# Patient Record
Sex: FEMALE | Race: WHITE
[De-identification: names, ages, dates, MRNs, and addresses within clinical notes are randomized per-mention and may not be internally consistent; named-entity substitution may affect disease eponyms.]

---

## 2018-11-26 ENCOUNTER — HOSPITAL ENCOUNTER (OUTPATIENT)
Dept: HOSPITAL 97 - 2ND | Age: 78
Setting detail: OBSERVATION
LOS: 2 days | Discharge: HOME HEALTH | End: 2018-11-28
Attending: INTERNAL MEDICINE | Admitting: INTERNAL MEDICINE
Payer: COMMERCIAL

## 2018-11-26 DIAGNOSIS — D64.9: Primary | ICD-10-CM

## 2018-11-26 DIAGNOSIS — K20.9: ICD-10-CM

## 2018-11-26 DIAGNOSIS — Z86.73: ICD-10-CM

## 2018-11-26 DIAGNOSIS — K44.9: ICD-10-CM

## 2018-11-26 DIAGNOSIS — I48.91: ICD-10-CM

## 2018-11-26 DIAGNOSIS — K92.2: ICD-10-CM

## 2018-11-26 DIAGNOSIS — I10: ICD-10-CM

## 2018-11-26 DIAGNOSIS — K29.70: ICD-10-CM

## 2018-11-26 LAB
ALBUMIN SERPL BCP-MCNC: 3.3 G/DL (ref 3.4–5)
ALP SERPL-CCNC: 63 U/L (ref 45–117)
ALT SERPL W P-5'-P-CCNC: 35 U/L (ref 12–78)
AST SERPL W P-5'-P-CCNC: 31 U/L (ref 15–37)
BUN BLD-MCNC: 34 MG/DL (ref 7–18)
FERRITIN SERPL-MCNC: 122.1 NG/ML (ref 8–388)
GLUCOSE SERPLBLD-MCNC: 96 MG/DL (ref 74–106)
HCT VFR BLD CALC: 25.6 % (ref 36–45)
INR BLD: 1.56
LYMPHOCYTES # SPEC AUTO: 1.1 K/UL (ref 0.7–4.9)
MAGNESIUM SERPL-MCNC: 2.5 MG/DL (ref 1.8–2.4)
MCH RBC QN AUTO: 33.7 PG (ref 27–35)
MCV RBC: 100 FL (ref 80–100)
PMV BLD: 7.6 FL (ref 7.6–11.3)
POTASSIUM SERPL-SCNC: 3.6 MMOL/L (ref 3.5–5.1)
RBC # BLD: 2.56 M/UL (ref 3.86–4.86)
UA COMPLETE W REFLEX CULTURE PNL UR: (no result)
UA DIPSTICK W REFLEX MICRO PNL UR: (no result)

## 2018-11-26 PROCEDURE — 36415 COLL VENOUS BLD VENIPUNCTURE: CPT

## 2018-11-26 PROCEDURE — 83735 ASSAY OF MAGNESIUM: CPT

## 2018-11-26 PROCEDURE — 85730 THROMBOPLASTIN TIME PARTIAL: CPT

## 2018-11-26 PROCEDURE — 85014 HEMATOCRIT: CPT

## 2018-11-26 PROCEDURE — 43239 EGD BIOPSY SINGLE/MULTIPLE: CPT

## 2018-11-26 PROCEDURE — 71046 X-RAY EXAM CHEST 2 VIEWS: CPT

## 2018-11-26 PROCEDURE — 94760 N-INVAS EAR/PLS OXIMETRY 1: CPT

## 2018-11-26 PROCEDURE — 81015 MICROSCOPIC EXAM OF URINE: CPT

## 2018-11-26 PROCEDURE — 82728 ASSAY OF FERRITIN: CPT

## 2018-11-26 PROCEDURE — 88312 SPECIAL STAINS GROUP 1: CPT

## 2018-11-26 PROCEDURE — 80048 BASIC METABOLIC PNL TOTAL CA: CPT

## 2018-11-26 PROCEDURE — 86850 RBC ANTIBODY SCREEN: CPT

## 2018-11-26 PROCEDURE — 85025 COMPLETE CBC W/AUTO DIFF WBC: CPT

## 2018-11-26 PROCEDURE — 84100 ASSAY OF PHOSPHORUS: CPT

## 2018-11-26 PROCEDURE — 86901 BLOOD TYPING SEROLOGIC RH(D): CPT

## 2018-11-26 PROCEDURE — 88305 TISSUE EXAM BY PATHOLOGIST: CPT

## 2018-11-26 PROCEDURE — 93005 ELECTROCARDIOGRAM TRACING: CPT

## 2018-11-26 PROCEDURE — 81003 URINALYSIS AUTO W/O SCOPE: CPT

## 2018-11-26 PROCEDURE — 74177 CT ABD & PELVIS W/CONTRAST: CPT

## 2018-11-26 PROCEDURE — 73502 X-RAY EXAM HIP UNI 2-3 VIEWS: CPT

## 2018-11-26 PROCEDURE — 88313 SPECIAL STAINS GROUP 2: CPT

## 2018-11-26 PROCEDURE — 85610 PROTHROMBIN TIME: CPT

## 2018-11-26 PROCEDURE — 36430 TRANSFUSION BLD/BLD COMPNT: CPT

## 2018-11-26 PROCEDURE — 73721 MRI JNT OF LWR EXTRE W/O DYE: CPT

## 2018-11-26 PROCEDURE — 87040 BLOOD CULTURE FOR BACTERIA: CPT

## 2018-11-26 PROCEDURE — 80076 HEPATIC FUNCTION PANEL: CPT

## 2018-11-26 PROCEDURE — 87086 URINE CULTURE/COLONY COUNT: CPT

## 2018-11-26 PROCEDURE — 87088 URINE BACTERIA CULTURE: CPT

## 2018-11-26 PROCEDURE — 70551 MRI BRAIN STEM W/O DYE: CPT

## 2018-11-26 PROCEDURE — 85018 HEMOGLOBIN: CPT

## 2018-11-26 PROCEDURE — 86900 BLOOD TYPING SEROLOGIC ABO: CPT

## 2018-11-26 RX ADMIN — MELATONIN SCH: 3 TAB ORAL at 21:00

## 2018-11-26 RX ADMIN — IRON SUPPLEMENT SCH: 325 TABLET ORAL at 21:00

## 2018-11-26 RX ADMIN — SODIUM CHLORIDE SCH MLS: 0.45 INJECTION, SOLUTION INTRAVENOUS at 15:12

## 2018-11-26 RX ADMIN — ATORVASTATIN CALCIUM SCH: 10 TABLET, FILM COATED ORAL at 21:00

## 2018-11-26 NOTE — RAD REPORT
EXAM DESCRIPTION:  RAD - Hip Left 2 View - 11/26/2018 5:23 pm

 

CLINICAL HISTORY:  Fall, hip pain

 

COMPARISON:  None.

 

FINDINGS:  AP and frogleg views of the left hip were obtained. There is no fracture or dislocation. N
o acute or destructive bony process seen.

 

No soft tissue abnormality.

 

 

IMPRESSION:  Negative left hip examination for acute or significant findings.

## 2018-11-26 NOTE — RAD REPORT
EXAM DESCRIPTION:  CT - Abdomen   Pelvis W Contrast - 11/26/2018 5:00 pm

 

CLINICAL HISTORY:  GI bleed, abdominal pain, diarrhea

 

COMPARISON:  None.

 

TECHNIQUE:  Biphasic, helical CT imaging of the abdomen and pelvis was performed following 100 ml non
-ionic IV contrast. Oral contrast was given.

 

All CT scans are performed using dose optimization technique as appropriate and may include automated
 exposure control or mA/KV adjustment according to patient size.

 

FINDINGS:  No suspicious findings in the lung bases. Cardiomegaly present without pericardial effusio
n.

 

The liver, spleen, and pancreas show no suspicious findings. Two large gallstones are present in a no
rmal-sized gallbladder. No acute gallbladder process seen. No biliary tree dilatation.

 

Symmetric renal function is seen with no hydronephrosis or suspicious renal mass. No pyelonephritis o
r acute renal parenchymal process. No urinary bladder abnormality. Uterus is absent. Ovaries are abse
nt or atrophic.

 

No gastric dilatation or wall thickening. Stomach is only partially filled. No dilated large or small
 bowel. Patient has moderate stool volume throughout the colon and a moderately prominent sigmoid div
erticulosis. No diverticulitis findings.  No free air, free fluid or inflammatory stranding.  No norris
ia, mass or bulky lymphadenopathy. The urinary bladder is without significant finding. No adrenal abn
ormality.

 

No suspicious bony findings.

 

 

IMPRESSION:  No free air, obstruction or other surgically emergent finding.

 

Moderately large stool volume throughout the colon with prominent diverticulosis. No diverticulitis o
r acute GI process seen.

 

Cholelithiasis without active gallbladder or biliary tree process identifiable.

 

Cardiomegaly.

## 2018-11-26 NOTE — RAD REPORT
EXAM DESCRIPTION:  MRI - Brain Wo Cont - 11/26/2018 5:49 pm

 

CLINICAL HISTORY:  Syncope, right-sided weakness

 

COMPARISON:  None.

 

TECHNIQUE:  Sagittal T1-weighted images were obtained along with axial PD, heavily T2-weighted and T2
-FLAIR images. Axial DWI and ADC mapping sequences were also obtained along with coronal heavily T2-w
eighted images.

 

FINDINGS:  No intracranial hemorrhage, mass or acute infarction. There is no edema or shift of midlin
e structures. Mild to moderate underlying atrophy changes are present. Ventricular size is in proport
ion. There is a large area of abnormal T2 signal with associated volume loss changes in the left cere
bral frontal parietal junction. Scattered white matter signal abnormalities elsewhere in the cerebral
 hemispheres. Brainstem, cerebellum and basal ganglia are mostly spared. Gray-matter/white matter rosey
ction is preserved. Signal voids are seen as a normal finding in the major intracranial vessels.

 

No globe or orbital content abnormality. Sella and suprasellar regions are normal.

 

Mastoid air cells and paranasal sinuses are clear.

 

 

IMPRESSION:  No acute infarction. No hemorrhage, mass or acute intracranial finding.

 

Scattered atrophy and chronic ischemic change with focally more pronounced signal abnormality related
 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

## 2018-11-26 NOTE — RAD REPORT
EXAM DESCRIPTION:  RAD - Chest Pa And Lat (2 Views) - 11/26/2018 5:22 pm

 

CLINICAL HISTORY:  Syncope, chest pain

 

COMPARISON:  None.

 

TECHNIQUE:  PA and lateral views of the chest were obtained.

 

FINDINGS:  The lungs are fibrotic.  No peripheral mass or consolidation. Upper lobe vasculature withi
n normal limits. Mild cardiomegaly without vascular engorgement. Trachea is midline. No pleural effus
ion or pneumothorax seen.  No acute bony finding noted.  No aortic abnormality.

 

IMPRESSION:  Prominent fibrotic lung pattern without acute cardiopulmonary finding.

## 2018-11-26 NOTE — RAD REPORT
EXAM DESCRIPTION:  MRI - Hip Left Wo Cont - 11/26/2018 6:19 pm

 

CLINICAL HISTORY:  Multiple fall history, pain out of proportion to imaging and physical exam finding
s

 

COMPARISON:  Left hip November 26, CT imaging November 26

 

TECHNIQUE:  Multiplanar imaging of the pelvis and hip joints performed using T1 weighted, T2 fat satu
ration, T2 STIR and proton density sequencing.

 

FINDINGS:  Marrow signal characteristics of the proximal left femur are normal and similar to the rig
ht side. No sacral ala fracture. Bony pelvis is intact. No joint effusion or periarticular abnormalit
y seen. Contusion or edema changes are present in the inferior muscle fibers of the left-side gluteus
 musculature posterior to the left ischium. This is likely the impact site of the recent fall. There 
is no hematoma. No signal abnormality in the ischium.

 

IMPRESSION:  No hip fracture.

 

Bruising or contusion changes in the muscles and soft tissues posterior to the left ischium. There is
 no measurable hematoma.

## 2018-11-27 LAB
ANISOCYTOSIS BLD QL: (no result)
BLD SMEAR INTERP: (no result)
BUN BLD-MCNC: 24 MG/DL (ref 7–18)
GLUCOSE SERPLBLD-MCNC: 85 MG/DL (ref 74–106)
HCT VFR BLD CALC: 21.2 % (ref 36–45)
LYMPHOCYTES # SPEC AUTO: 0.7 K/UL (ref 0.7–4.9)
MAGNESIUM SERPL-MCNC: 2.4 MG/DL (ref 1.8–2.4)
MCH RBC QN AUTO: 34.2 PG (ref 27–35)
MCV RBC: 99 FL (ref 80–100)
MORPHOLOGY BLD-IMP: (no result)
PMV BLD: 8.3 FL (ref 7.6–11.3)
POTASSIUM SERPL-SCNC: 3.9 MMOL/L (ref 3.5–5.1)
RBC # BLD: 2.15 M/UL (ref 3.86–4.86)

## 2018-11-27 PROCEDURE — 0DB68ZX EXCISION OF STOMACH, VIA NATURAL OR ARTIFICIAL OPENING ENDOSCOPIC, DIAGNOSTIC: ICD-10-PCS

## 2018-11-27 PROCEDURE — 0DB58ZX EXCISION OF ESOPHAGUS, VIA NATURAL OR ARTIFICIAL OPENING ENDOSCOPIC, DIAGNOSTIC: ICD-10-PCS

## 2018-11-27 PROCEDURE — 30233N1 TRANSFUSION OF NONAUTOLOGOUS RED BLOOD CELLS INTO PERIPHERAL VEIN, PERCUTANEOUS APPROACH: ICD-10-PCS

## 2018-11-27 RX ADMIN — IRON SUPPLEMENT SCH: 325 TABLET ORAL at 20:48

## 2018-11-27 RX ADMIN — ATORVASTATIN CALCIUM SCH: 10 TABLET, FILM COATED ORAL at 20:48

## 2018-11-27 RX ADMIN — MELATONIN SCH: 3 TAB ORAL at 20:48

## 2018-11-27 RX ADMIN — DIGOXIN SCH: 125 TABLET ORAL at 08:43

## 2018-11-27 RX ADMIN — MAGNESIUM OXIDE TAB 400 MG (241.3 MG ELEMENTAL MG) SCH: 400 (241.3 MG) TAB at 08:44

## 2018-11-27 NOTE — EKG
Test Date:    2018-11-26               Test Time:    16:33:13

Technician:   AKHIL                                     

                                                     

MEASUREMENT RESULTS:                                       

Intervals:                                           

Rate:         69                                     

WI:                                                  

QRSD:         86                                     

QT:           410                                    

QTc:          439                                    

Axis:                                                

P:                                                   

WI:                                                  

QRS:          58                                     

T:            -17                                    

                                                     

INTERPRETIVE STATEMENTS:                                       

                                                     

Atrial fibrillation

Low voltage QRS

Nonspecific ST and T wave abnormality, probably digitalis effect

Abnormal ECG

No previous ECG available for comparison



Electronically Signed On 11-27-18 12:09:15 CST by Isaiah Gates

## 2018-11-27 NOTE — P.PN
Subjective


Date of Service: 11/27/18


Chief Complaint: FEELS BETTER,  SOME WEAK, GENERAL.





SHE HAS NO CHEST PAIN, DYSPNEA, WEAKNESS OR FATIGUE.  





Review of Systems


10-point ROS is otherwise unremarkable





Physical Examination





- Vital Signs


Temperature: 97.8 F


Blood Pressure: 110/60


Pulse: 71


Respirations: 16


Pulse Ox (%): 98





- Physical Exam


General: Alert, In no apparent distress


HEENT: Atraumatic, PERRLA, EOMI


Neck: Supple, JVD not distended


Respiratory: Clear to auscultation bilaterally, Normal air movement


Cardiovascular: Regular rate/rhythm, Normal S1 S2


Gastrointestinal: Normal bowel sounds, No tenderness


Musculoskeletal: No tenderness


Integumentary: No rashes


Neurological: Normal speech, Normal tone, Normal affect


Lymphatics: No axilla or inguinal lymphadenopathy





- Studies


Laboratory Data (last 24 hrs)





11/27/18 06:00: Sodium 142, Potassium 3.9, BUN 24 H, Creatinine 0.80, Glucose 85

, Magnesium 2.4


11/27/18 06:00: WBC 2.8 L D, Hgb 7.3 L*, Hct 21.2 L D, Plt Count 168





Medications List Reviewed: Yes





Assessment And Plan





- Current Problems (Diagnosis)


(1) Anemia


Current Visit: Yes   Status: Acute   


Plan: 


DOWN TO 7.3 GM.


TRANSFUSE TWO UNITS PACKED RBCS.


CALLED DR. CHRISTOPHER TO GET EGD DONE. 


HE IS NOT ON CALL TODAY. 


THIS WHOLE WEEK WE HAVE NO GI DOCTOR ON CALL AT THIS HOSPITAL 


I STARTED HER ON PROTONIX.





TALKED TO SON AND ANSWERS ALL QUESTIONS, ABOUT MRI, CT SCAN, GALL STONES.  SHE 

WITH NO PAIN IN ABDOMEN , GALL STONES ARE LEFT ALONE. SHE HAS NO PULMONARY 

SYMPTOMS.








(2) Gastrointestinal bleed


Onset Date: 11/27/18   Current Visit: Yes   Status: Acute

## 2018-11-28 LAB
BUN BLD-MCNC: 18 MG/DL (ref 7–18)
GLUCOSE SERPLBLD-MCNC: 97 MG/DL (ref 74–106)
HCT VFR BLD CALC: 32.6 % (ref 36–45)
HCT VFR BLD CALC: 33.1 % (ref 36–45)
LYMPHOCYTES # SPEC AUTO: 1 K/UL (ref 0.7–4.9)
MAGNESIUM SERPL-MCNC: 2.4 MG/DL (ref 1.8–2.4)
MCH RBC QN AUTO: 32.7 PG (ref 27–35)
MCV RBC: 95.4 FL (ref 80–100)
PMV BLD: 8 FL (ref 7.6–11.3)
POTASSIUM SERPL-SCNC: 4.5 MMOL/L (ref 3.5–5.1)
RBC # BLD: 3.42 M/UL (ref 3.86–4.86)

## 2018-11-28 RX ADMIN — MAGNESIUM OXIDE TAB 400 MG (241.3 MG ELEMENTAL MG) SCH: 400 (241.3 MG) TAB at 08:56

## 2018-11-28 RX ADMIN — DIGOXIN SCH: 125 TABLET ORAL at 08:56

## 2018-11-28 RX ADMIN — SODIUM CHLORIDE SCH: 0.45 INJECTION, SOLUTION INTRAVENOUS at 00:20

## 2018-11-28 NOTE — OP
Date of Procedure:  11/27/2018



Surgeon:  Isidro Huitron MD



Procedure To Be Performed:  Esophagogastroduodenoscopy.



Indication For Procedure:  Anemia, suspected upper GI bleed.



Plan For Anesthesia:  Monitored anesthesia care.



Technique:  After obtaining informed consent from the patient and explaining risks and complications 
which include, but are not limited to, bleeding, infection, perforation, and anesthesia complication,
 the patient was placed in the left lateral position and sedation was given.  From then on, the scope
 was advanced into the mouth and carefully guided up to the third portion of the duodenum.  There was
 no evidence of active bleeding.  After the completion of examination, the scope and equipment were w
ithdrawn and procedure terminated in a safe manner.



Findings:  Esophagus:  In the mid esophagus, there was an area of mucosal granularity that was seen. 
 Biopsies were taken.  There was evidence of LA grade B esophagitis in the distal esophagus as well a
s a small hiatal hernia. 



Stomach:  Mild patchy erythema seen in the antrum; however, in the body, there was a patch of moderat
e erythema with few erosions.  Biopsies taken from the body and antrum. 



Duodenum:  The bulb and second portion appeared normal.



Complications:  None.



Tolerance To Anesthesia:  Excellent.



Postoperative Diagnoses:  Abnormal esophageal __________ biopsy, esophagitis, erosive gastritis.



Plan:  

1.Await pathology results.

2.PPI to continue.

3.I will start back on diet, advance as tolerated.

4.Even though there was no stigmata of significant bleeding, it may be possible that the findings co
uld have caused a slow chronic anemia.  We will continue to evaluate this situation.  If needed, furt
her management can be undertaken as an outpatient basis.





US/MODL

DD:  11/28/2018 07:10:56Voice ID:  [id number]

DT:  11/28/2018 18:16:33Report ID:  [id number]

## 2018-11-28 NOTE — P.DS
Admission Date: 11/26/18


Discharge Date: 11/28/18


Disposition: DC HOME/HOME HEALTH CARE


Reason for Admission: FEELS BETTER,  SOME WEAK, GENERAL.





- Problems


(1) Anemia


Status: Acute   





(2) Gastrointestinal bleed


Onset Date: 11/27/18   Status: Acute   


Hospital Course: 





MS. THOMPSON HAS EROSIVE GASTRITIS LEADING TO ANEMIA .  WE HAD TO STOP XARELTO. 

FAMILY UNDERSTANDS THE RISK OF XARELTO AND NOT HAVING IT.  DR. STOREY IS MADE 

AWARE.  SHE IS STABLE TO DISCHARGE.  FEELS WELL.  SON AT BEDSIDE.  IF CONTINUES 

TO HAVE ANEMIA OR MORE LOSS OF BLOOD , WILL GET COLONSCOPY DONE.  RISK OF IT IS 

HIGHER THAN EGD.


Vital Signs/Physical Exam: 














Temp Pulse Resp BP Pulse Ox


 


 97 F   67   18   152/69 H  98 


 


 11/28/18 08:00  11/28/18 08:00  11/28/18 08:00  11/28/18 08:00  11/28/18 08:00








Laboratory Data at Discharge: 














WBC  4.1 K/uL (4.3-10.9)  L D 11/28/18  06:05    


 


Hgb  11.2 g/dL (12.0-15.0)  L  11/28/18  06:05    


 


Hct  32.6 % (36.0-45.0)  L  11/28/18  06:05    


 


Plt Count  187 K/uL (152-406)   11/28/18  06:05    


 


PT  18.5 SECONDS (9.5-12.5)  H  11/26/18  15:25    


 


INR  1.56   11/26/18  15:25    


 


APTT  34.5 SECONDS (24.3-36.9)   11/26/18  15:25    


 


Sodium  143 mmol/L (136-145)   11/28/18  06:05    


 


Potassium  4.5 mmol/L (3.5-5.1)   11/28/18  06:05    


 


BUN  18 mg/dL (7-18)   11/28/18  06:05    


 


Creatinine  0.80 mg/dL (0.55-1.3)   11/28/18  06:05    


 


Glucose  97 mg/dL ()   11/28/18  06:05    


 


Phosphorus  3.5 mg/dL (2.5-4.9)   11/26/18  15:25    


 


Magnesium  2.4 mg/dL (1.8-2.4)   11/28/18  06:05    


 


Total Bilirubin  0.4 mg/dL (0.2-1.0)   11/26/18  15:25    


 


AST  31 U/L (15-37)   11/26/18  15:25    


 


ALT  35 U/L (12-78)   11/26/18  15:25    


 


Alkaline Phosphatase  63 U/L ()   11/26/18  15:25    








Home Medications: 








Atorvastatin Calcium [Lipitor*] 1 tab PO BEDTIME 11/26/18 


Calcium Carbonate [Calcium] 1,000 mg PO BID 11/26/18 


Cholecalciferol (Vitamin D3) [Vitamin D 1000 Iu Tab*] 2,000 unit PO DAILY 11/26/ 18 


Digoxin [Lanoxin*] 0.125 mg PO DAILY 11/26/18 


Ferrous Sulfate [Ferrous Sulfate*] 1 tab PO BEDTIME 11/26/18 


Furosemide [Lasix] 1 tab PO DAILY 11/26/18 


Magnesium Oxide [Mag-Oxide] 400 mg PO DAILY 11/26/18 


Melatonin 1 tab PO BEDTIME 11/26/18 


Multivitamin [Daily Multivitamin] 1 tab PO DAILY 11/26/18 


[place]-3S/Dha/Epa/Fish Oil/D3 [Omega-3 + D Softgel] 1 cap PO BEDTIME 11/26/18 


Potassium Chloride 1 tab PO DAILY 11/26/18 


Pantoprazole [Protonix Tab*] 40 mg PO DAILY #90 tab 11/28/18 





New Medications: 


Pantoprazole [Protonix Tab*] 40 mg PO DAILY #90 tab


Followup: 


Sid Craven MD [Primary Care Provider] -  (Call for appointment for 1-2 

weeks.)

## 2018-12-20 ENCOUNTER — HOSPITAL ENCOUNTER (OUTPATIENT)
Dept: HOSPITAL 97 - ER | Age: 78
Setting detail: OBSERVATION
Discharge: HOME HEALTH | End: 2018-12-20
Attending: INTERNAL MEDICINE | Admitting: INTERNAL MEDICINE
Payer: COMMERCIAL

## 2018-12-20 VITALS — BODY MASS INDEX: 25.8 KG/M2

## 2018-12-20 VITALS — SYSTOLIC BLOOD PRESSURE: 92 MMHG | DIASTOLIC BLOOD PRESSURE: 55 MMHG

## 2018-12-20 VITALS — OXYGEN SATURATION: 94 %

## 2018-12-20 VITALS — TEMPERATURE: 97.5 F

## 2018-12-20 DIAGNOSIS — Z88.2: ICD-10-CM

## 2018-12-20 DIAGNOSIS — I08.0: ICD-10-CM

## 2018-12-20 DIAGNOSIS — Z86.73: ICD-10-CM

## 2018-12-20 DIAGNOSIS — Z87.891: ICD-10-CM

## 2018-12-20 DIAGNOSIS — D64.9: ICD-10-CM

## 2018-12-20 DIAGNOSIS — I48.91: Primary | ICD-10-CM

## 2018-12-20 LAB
ALBUMIN SERPL BCP-MCNC: 3.4 G/DL (ref 3.4–5)
ALP SERPL-CCNC: 104 U/L (ref 45–117)
ALT SERPL W P-5'-P-CCNC: 43 U/L (ref 12–78)
AST SERPL W P-5'-P-CCNC: 35 U/L (ref 15–37)
BUN BLD-MCNC: 29 MG/DL (ref 7–18)
COHGB MFR BLDA: 0.9 % (ref 0–1.5)
GLUCOSE SERPLBLD-MCNC: 295 MG/DL (ref 74–106)
HCT VFR BLD CALC: 35.1 % (ref 36–45)
INR BLD: 1.08
LIPASE SERPL-CCNC: 99 U/L (ref 73–393)
LYMPHOCYTES # SPEC AUTO: 2.2 K/UL (ref 0.7–4.9)
MAGNESIUM SERPL-MCNC: 2 MG/DL (ref 1.8–2.4)
NT-PROBNP SERPL-MCNC: 2510 PG/ML (ref ?–450)
OXYHGB MFR BLDA: 96.7 % (ref 94–97)
PMV BLD: 8.5 FL (ref 7.6–11.3)
POTASSIUM SERPL-SCNC: 4 MMOL/L (ref 3.5–5.1)
RBC # BLD: 3.62 M/UL (ref 3.86–4.86)
SAO2 % BLDA: 98.7 % (ref 92–98.5)
TROPONIN (EMERG DEPT USE ONLY): 0.02 NG/ML (ref 0–0.04)

## 2018-12-20 PROCEDURE — 84484 ASSAY OF TROPONIN QUANT: CPT

## 2018-12-20 PROCEDURE — 93005 ELECTROCARDIOGRAM TRACING: CPT

## 2018-12-20 PROCEDURE — 83880 ASSAY OF NATRIURETIC PEPTIDE: CPT

## 2018-12-20 PROCEDURE — 83605 ASSAY OF LACTIC ACID: CPT

## 2018-12-20 PROCEDURE — 74177 CT ABD & PELVIS W/CONTRAST: CPT

## 2018-12-20 PROCEDURE — 36415 COLL VENOUS BLD VENIPUNCTURE: CPT

## 2018-12-20 PROCEDURE — 83735 ASSAY OF MAGNESIUM: CPT

## 2018-12-20 PROCEDURE — 85025 COMPLETE CBC W/AUTO DIFF WBC: CPT

## 2018-12-20 PROCEDURE — 99285 EMERGENCY DEPT VISIT HI MDM: CPT

## 2018-12-20 PROCEDURE — 97162 PT EVAL MOD COMPLEX 30 MIN: CPT

## 2018-12-20 PROCEDURE — 76705 ECHO EXAM OF ABDOMEN: CPT

## 2018-12-20 PROCEDURE — 83690 ASSAY OF LIPASE: CPT

## 2018-12-20 PROCEDURE — 80162 ASSAY OF DIGOXIN TOTAL: CPT

## 2018-12-20 PROCEDURE — 94660 CPAP INITIATION&MGMT: CPT

## 2018-12-20 PROCEDURE — 85610 PROTHROMBIN TIME: CPT

## 2018-12-20 PROCEDURE — 87040 BLOOD CULTURE FOR BACTERIA: CPT

## 2018-12-20 PROCEDURE — 82805 BLOOD GASES W/O2 SATURATION: CPT

## 2018-12-20 PROCEDURE — 96365 THER/PROPH/DIAG IV INF INIT: CPT

## 2018-12-20 PROCEDURE — 93306 TTE W/DOPPLER COMPLETE: CPT

## 2018-12-20 PROCEDURE — 80048 BASIC METABOLIC PNL TOTAL CA: CPT

## 2018-12-20 PROCEDURE — 96375 TX/PRO/DX INJ NEW DRUG ADDON: CPT

## 2018-12-20 PROCEDURE — 71045 X-RAY EXAM CHEST 1 VIEW: CPT

## 2018-12-20 PROCEDURE — 80076 HEPATIC FUNCTION PANEL: CPT

## 2018-12-20 RX ADMIN — FUROSEMIDE SCH MG: 10 INJECTION, SOLUTION INTRAVENOUS at 09:22

## 2018-12-20 RX ADMIN — METHYLPREDNISOLONE SODIUM SUCCINATE SCH MG: 40 INJECTION, POWDER, FOR SOLUTION INTRAMUSCULAR; INTRAVENOUS at 12:16

## 2018-12-20 RX ADMIN — FUROSEMIDE SCH: 10 INJECTION, SOLUTION INTRAVENOUS at 17:00

## 2018-12-20 RX ADMIN — METHYLPREDNISOLONE SODIUM SUCCINATE SCH: 40 INJECTION, POWDER, FOR SOLUTION INTRAMUSCULAR; INTRAVENOUS at 17:00

## 2018-12-20 NOTE — P.SSS
Patient History


Date of Service: 12/20/18


Reason for admission: PALPITATIONS


History of Present Illness: 





MS. THOMPSON LIVES AT [facility].  SHE HAD PALPITATIONS AND SO REPORTED TO ER.  

SHE IS BACK TO NORMAL NOW.  SHE HAS NO CHOICE OF SELECTING LOW SALT FOOD.  

UNFORTUNATELY LOT OF FOOD SHE GETS IS HIGH IN SALT.  SHE HAD SOUP, SALAD ETC 

AND MOST LIKELY SALT OVERLOAD.  SHE FEELS GREAT AND WANTS TO GO HOME.  


Allergies





Sulfa (Sulfonamide Antibiotics) Allergy (Verified 11/26/18 14:03)


 Itching/Hives/Rash





Home Medications: 








Atorvastatin Calcium [Lipitor*] 1 tab PO BEDTIME 11/26/18 


Cholecalciferol (Vitamin D3) [Vitamin D 1000 Iu Tab*] 2,000 unit PO DAILY 11/26/ 18 


Digoxin [Lanoxin*] 0.125 mg PO DAILY 11/26/18 


Ferrous Sulfate [Ferrous Sulfate*] 1 tab PO BEDTIME 11/26/18 


Furosemide [Lasix] 1 tab PO DAILY 11/26/18 


Magnesium Oxide [Mag-Oxide] 400 mg PO DAILY 11/26/18 


Multivitamin [Daily Multivitamin] 1 tab PO DAILY 11/26/18 


[place]-3S/Dha/Epa/Fish Oil/D3 [Omega-3 + D Softgel] 1 cap PO BEDTIME 11/26/18 


Pantoprazole [Protonix Tab*] 40 mg PO DAILY #90 tab 11/28/18 


Calcium Carbonate/Vitamin D3 [Calcium 600-Vit D3 2,500 Sftgl] 1,200 mg PO DAILY 

12/20/18 


Furosemide [Lasix] 10 mg PO DAILY 12/20/18 


Melatonin 10 mg PO DAILY 12/20/18 


Spironolactone [Aldactone] 25 mg PO DAILY 12/20/18 








- Past Medical/Surgical History


Has patient received pneumonia vaccine in the past: Yes


Diabetic: No


-: Atrial Fibrillation


-: Stroke


-: Hysterectomy


-: Carotid artery surgery


-: appendectomy


-: oophorectomy





- Family History


  ** Father


-: Heart disease





- Social History


Smoking Status: Former smoker


Alcohol use: No


CD- Drugs: No


Caffeine use: Yes


Place of Residence: Home





Review of Systems


10-point ROS is otherwise unremarkable


Respiratory: Shortness of Breath





Physical Examination





- Vital Signs


Temperature: 97.5 F


Blood Pressure: 92/55


Pulse: 72


Respirations: 18


Pulse Ox (%): 92





- Physical Exam


General: Alert, In no apparent distress


HEENT: Atraumatic, PERRLA, Mucous membr. moist/pink, EOMI, Sclerae nonicteric


Neck: Supple, 2+ carotid pulse no bruit, No LAD, Without JVD or thyroid 

abnormality


Respiratory: Clear to auscultation bilaterally, Normal air movement


Cardiovascular: Regular rate/rhythm, Normal S1 S2


Gastrointestinal: Normal bowel sounds, No tenderness


Musculoskeletal: No tenderness


Integumentary: No rashes


Neurological: Normal gait, Normal speech, Normal strength at 5/5 x4 extr, 

Normal tone, Normal affect


Lymphatics: No axilla or inguinal lymphadenopathy





- Studies


Laboratory Data (last 24 hrs)





12/20/18 02:05: PT 12.8 H, INR 1.08


12/20/18 02:05: WBC 10.5, Hgb 11.8 L, Hct 35.1 L, Plt Count 279


12/20/18 02:05: Sodium 138, Potassium 4.0, BUN 29 H, Creatinine 1.10, Glucose 

295 H, Magnesium 2.0, Total Bilirubin 0.5, AST 35, ALT 43, Alkaline Phosphatase 

104, Lipase 99








- Diagnosis (Problem(s))


(1) Rapid atrial fibrillation


Status: Chronic   


Plan: 


WITH ANY KIND OF SALT OVERLOAD SHE CAN GO INTO HEART FAILURE AS SHE HAS A. FIB.

  I BELIEVE THAT IS WHAT HAPPENED.  SHE HAS NO CHEST PAIN.  SHE FOR NOW WAS 

TAKING LASIX AND SPIRONOLACTONE FOR EDEMA BUT I TOLD HER TO TAKE IT DAILY AS 

SHE WILL NOT BE ABLE TO CHANGE HER DIET TO HEART HEALTHY DIET AS LONG AS SHE 

HAS TO DEPEND ON ASSISTED LIVING FACILITY.  DAUGHTER IN LAW UNDERSTANDS.  I 

WILL SEE HER IN OFFICE IN TWO WEEKS. 








- Disposition


Disposition: ROUTINE DISCHARGE

## 2018-12-20 NOTE — RAD REPORT
EXAM DESCRIPTION:  US - Abdomen Exam Limited - 12/20/2018 9:00 am

 

CLINICAL HISTORY:  gallstones

 

COMPARISON:  Abdomen   Pelvis W Contrast dated 12/20/2018

 

FINDINGS:  The gallbladder demonstrates multiple shadowing gallstones. No pericholecystic fluid or ga
llbladder wall thickening. The common bile duct is normal measuring 4 mm.

 

The liver demonstrates no findings of intrahepatic biliary dilatation.

 

IMPRESSION:  Cholelithiasis.

## 2018-12-20 NOTE — EKG
Test Date:    2018-12-20               Test Time:    01:57:36

Technician:   JOEY                                     

                                                     

MEASUREMENT RESULTS:                                       

Intervals:                                           

Rate:         118                                    

CO:                                                  

QRSD:         90                                     

QT:           312                                    

QTc:          437                                    

Axis:                                                

P:                                                   

CO:                                                  

QRS:          73                                     

T:            1                                      

                                                     

INTERPRETIVE STATEMENTS:                                       

                                                     

Atrial fibrillation with rapid ventricular response

Nonspecific ST and T wave abnormality

Abnormal ECG

Compared to ECG 11/26/2018 16:33:13

No significant changes



Electronically Signed On 12-20-18 05:57:09 CST by Richmond Guzman

## 2018-12-20 NOTE — RAD REPORT
EXAM DESCRIPTION:  RAD - Chest Single View - 12/20/2018 2:19 am

 

CLINICAL HISTORY:  DYSPNEA

Chest pain.

 

COMPARISON:  Chest Pa And Lat (2 Views) dated 11/26/2018

 

FINDINGS:  Portable technique limits examination quality.

 

Mild bilateral interstitial lung opacities are present, likely representing interstitial pneumonia or
 mild interstitial pulmonary edema. Trace pleural fluid is present bilaterally. The heart is mildly e
nlarged in size. No displaced fractures.Aortic atherosclerosis.

 

IMPRESSION:  Mild CHF likely present.

## 2018-12-20 NOTE — ER
Nurse's Notes                                                                                     

 [facility]                                                                

Name: Trish Landrum                                                                                  

Age: 78 yrs                                                                                       

Sex: Female                                                                                       

: 1940                                                                                   

MRN: [id number]                                                                                   

Arrival Date: 2018                                                                          

Time: 01:44                                                                                       

Account#: [id number]                                                                            

Bed 7                                                                                             

Private MD:                                                                                       

Diagnosis: Dyspnea;Cardiomegaly;Cholelithiasis;Diastolic (congestive) heart failure;Pleural       

  effusion in conditions classified elsewhere;Acidosis-respiratory acidosis;Atrial                

  fibrillation and flutter                                                                        

                                                                                                  

Presentation:                                                                                     

                                                                                             

01:42 Presenting complaint: EMS states: Pt lives at [facility], pt reports abdominal pain   ea  

      and respiratory distress that started at 0015. Transition of care: patient was not          

      received from another setting of care. Onset of symptoms was 2018. Risk        

      Assessment: Do you want to hurt yourself or someone else? Patient reports no desire to      

      harm self or others. Initial Sepsis Screen: Does the patient meet any 2 criteria? RR >      

      20 per min. HR > 90 bpm. Yes Does the patient have a suspected source of infection? No.     

      Patient's initial sepsis screen is negative. Care prior to arrival: 20 G to left AC.        

01:42 Method Of Arrival: EMS: [facility]                                                ea  

01:42 Acuity: VENKAT 3                                                                           ea  

                                                                                                  

Triage Assessment:                                                                                

01:42 General: Appears distressed, uncomfortable, Behavior is restless, Pt placed on BiPAP    ea  

      per respiratory, tolerating well. . Pain: Complains of pain in abdomen. Neuro: Level of     

      Consciousness is awake, alert, obeys commands, Oriented to person, place, time,             

      situation. Cardiovascular: Heart tones S1 S2 present Patient's skin is warm and dry.        

      Rhythm is atrial fibrillation. Respiratory: Respiratory: Airway is patent Respiratory       

      effort is labored, Respiratory pattern is tachypnea Breath sounds are clear                 

      bilaterally. GI: Abdomen is round non-distended, Bowel sounds present X 4 quads.            

      Reports lower abdominal pain, upper abdominal pain, diarrhea, nausea. Derm: Skin is         

      clammy, Skin is pale, Skin temperature is warm.                                             

                                                                                                  

Historical:                                                                                       

- Allergies:                                                                                      

02:16 Sulfa (Sulfonamide Antibiotics);                                                        ea  

- Home Meds:                                                                                      

04:24 Ferrous Sulfate Oral nightly [Active]; digoxin 125 mcg oral tab once daily [Active];    lp1 

      MagOx 400 mg oral tab daily [Active]; Melatonin Oral nightly [Active]; Protonix 40 mg       

      Oral TbEC 1 tab once daily [Active]; calcium carbonate 500 mg calcium (1,250 mg) Oral       

      tab 1,000 mg twice a day [Active]; Vitamin D3 2,000 unit oral tab daily [Active];           

      omega-3s-dha-epa-fish oil-D3 300 mg-1,200 mg -1,000 unit oral cap nightly [Active];         

04:31 potassium chloride 20 mEq oral TbER once daily [Active]; Lasix 20 mg oral tab once      lp1 

      daily [Active]; atorvastatin 10 mg oral tab nightly [Active];                               

- PMHx:                                                                                           

02:16 Atrial Fib;                                                                             ea  

04:24 Corrosive gastritis; Anemia;                                                            lp1 

04:31 Aortic valve regurgitation; Mitral valve regurgitation; CVA; LVH; pulmonary HTN;        lp1 

                                                                                                  

- Immunization history:: Adult Immunizations up to date.                                          

- Social history:: Smoking status: Patient/guardian denies using tobacco.                         

- Ebola Screening: : No symptoms or risks identified at this time.                                

                                                                                                  

                                                                                                  

Screenin:17 Abuse screen: Denies threats or abuse. Nutritional screening: No deficits noted.        ea  

      Tuberculosis screening: No symptoms or risk factors identified. Fall Risk                   

                                                                                                  

Assessment:                                                                                       

01:45 Reassessment: see triage assessment.                                                    ea  

02:55 Reassessment: Patient and/or family updated on plan of care and expected duration. Pain ea  

      level reassessed. Patient states feeling better. Patient states symptoms have improved.     

04:02 Reassessment: Patient appears in no apparent distress at this time. Patient and/or      ca1 

      family updated on plan of care and expected duration. Pain level reassessed. Patient is     

      alert, oriented x 3, equal unlabored respirations, skin warm/dry/pink. family at            

      bedside. .                                                                                  

05:00 Reassessment: Dr. Gunter at bedside to discuss results with patient and son; Denies   lp1 

      any abdominal pain on palpation. Respiratory: Respiratory effort is even. Derm: Skin is     

      fragile, is thin, Skin is dry, Skin is normal.                                              

06:00 Reassessment: Assisted patient to bedside commode. Tolerated well, no dyspnea or pain   ca1 

      upon ambulation noted and reported. .                                                       

06:05 Reassessment: Called 2nd floor to give report. Informed that the nurse taking the       ca1 

      patient is currently transferring a patient to ICU. Informed patient and family. .          

06:39 Reassessment: Patient appears in no apparent distress at this time. Assisted to the     ca1 

      commode. Tolerated well. No respiratory distress and pain noted and reported. .             

                                                                                                  

Vital Signs:                                                                                      

01:45  / 88; Pulse 123; Resp 32; Temp 97.6; Pulse Ox 89% on Non-rebreather mask; Weight ea  

      77.11 kg; Height 5 ft. 8 in. (172.72 cm);                                                   

02:26  / 72; Pulse 79; Resp 23; Pulse Ox 99% ;                                          ea  

02:53  / 65; Pulse 90; Resp 23; Pulse Ox 100% on BiPAP O2 75%, IPAP 20, PIP 16;         ea  

04:00  / 53; Pulse 87; Resp 24; Pulse Ox 97% on 4 lpm NC;                               ca1 

05:00  / 62; Pulse 88; Resp 23; Pulse Ox 95% on 4 lpm NC; Pain 0/10;                    ca1 

06:30  / 60; Pulse 84; Resp 23; Pulse Ox 100% on 4 lpm NC;                              ca1 

01:45 Body Mass Index 25.85 (77.11 kg, 172.72 cm)                                             ea  

01:45 Pt placed on bipap per respiratory tech                                                 ea  

                                                                                                  

ED Course:                                                                                        

01:42 Patient has correct armband on for positive identification. Placed in gown. Bed in low  ea  

      position. Call light in reach. Side rails up X2.                                            

01:42 Arm band placed on right wrist. Patient placed in an exam room, on a stretcher, on      ea  

      oxygen, on cardiac monitor, on pulse oximetry.                                              

01:42 Maintain EMS IV. Dressing intact. Good blood return noted. Site clean \T\ dry. Gauge \T\    ea

      site: 20 G to left AC.                                                                      

01:44 Patient arrived in ED.                                                                  al2 

01:50 Initial lab(s) drawn, by me, sent to lab. First set of blood cultures drawn. Inserted   ca1 

      saline lock: 20 gauge in right antecubital area, using aseptic technique. Blood             

      collected.                                                                                  

01:53 Bradford Bhatt PA is PHCP.                                                               [id number] 

01:53 Kamari Gunter MD is Attending Physician.                                             [id number] 

02:05 Second set of blood cultures drawn by me.                                               ca1 

02:09 Triage completed.                                                                       ea  

02:15 X-ray completed. Portable x-ray completed in exam room. Patient tolerated procedure     kw  

      well.                                                                                       

02:17 XRAY Chest (1 view) In Process Unspecified.                                             EDMS

02:39 Beulah Nolan, ILANA is Primary Nurse.                                                    ea  

02:53 Notified Nurse Practitioner and/or Physician Assistant of a critical lab result(s),     lp1 

      Lactate 3.9.                                                                                

03:00 Report given to Antionette PRECIADO.                                                              ea  

03:31 Patient moved to CT via stretcher.                                                      eh  

03:58 CT completed. Patient tolerated procedure well. Patient moved back from CT.             eh  

03:59 CT Abd/Pelvis - W/Contrast In Process Unspecified.                                      EDMS

04:55 Sid Craven MD is Hospitalizing Provider.                                            nichelle 

06:35 No provider procedures requiring assistance completed. Patient admitted, IV remains in  lp1 

      place.                                                                                      

                                                                                                  

Administered Medications:                                                                         

01:50 Drug: Zofran 4 mg Route: IVP; Site: left antecubital;                                   ea  

03:01 Follow up: Response: No adverse reaction                                                ca1 

03:15 Drug: NS 0.9% 1000 ml Route: IV; Rate: 1000 ml; Site: right antecubital;                ca1 

04:20 Follow up: IV Status: Completed infusion                                                ca1 

03:20 Drug: LevaQUIN 500 mg Volume: 100 ml; Route: IVPB; Infused Over: 60 mins; Site: right   ca1 

      antecubital;                                                                                

04:20 Follow up: Response: No adverse reaction; IV Status: Completed infusion                 ca1 

04:36 Drug: SOLU-Medrol 125 mg Route: IVP; Site: right antecubital;                           ca1 

06:38 Follow up: Response: No adverse reaction                                                ca1 

04:40 Drug: Digoxin 0.5 mg Route: IVP; Site: right antecubital;                               ca1 

06:37 Follow up: Response: No adverse reaction                                                ca1 

05:10 Drug: ProTONIX 40 mg Route: IVP; Site: right antecubital;                               ca1 

06:38 Follow up: Response: No adverse reaction                                                ca1 

05:12 Drug: AtroVENT Aerosol 0.5 mg Route: Inhalation;                                        ca1 

05:12 Drug: Xopenex 1.25 mg Route: Inhalation;                                                ca1 

05:15 Drug: Lasix 20 mg Route: IVP; Site: right antecubital;                                  ca1 

06:38 Follow up: Urine output 120 ml; Response: No adverse reaction                           ca1 

                                                                                                  

                                                                                                  

Output:                                                                                           

06:38 Urine: 120ml; Total: 120ml.                                                             ca1 

                                                                                                  

Outcome:                                                                                          

04:55 Decision to Hospitalize by Provider.                                                    nichelle 

06:35 Admitted to Med/surg accompanied by nurse, via wheelchair, room 223, with oxygen, with  lp1 

      chart, Report called to  ILANA Byrd                                                          

06:35 Condition: stable                                                                           

06:35 Instructed on the need for admit.                                                           

07:05 Patient left the ED.                                                                    ca1 

                                                                                                  

Signatures:                                                                                       

Dispatcher [employer]                           EDKamari Fountain MD MD cha Hagler, Ervin eh Whitley, Kimberlee kw Pena, Laura, RN                         RN   lp1                                                  

Bradford Bhatt PA PA   jr8                                                  

Beulah Nolan RN RN ea Love, Angelica al2 Acob, Cheryl, RN                        RN   ca1                                                  

                                                                                                  

Corrections: (The following items were deleted from the chart)                                    

02:38 01:42 General: Appears distressed, uncomfortable, Behavior is restless, ea              ea  

02:38 01:42 Respiratory: Airway is patent Respiratory effort is labored, Respiratory pattern  ea  

      is tachypnea Breath sounds are clear bilaterally. ea                                        

04:24 02:16 Home Meds: atorvastatin oral oral; ea                                             lp1 

04:24 02:16 Home Meds: Ferrous Sulfate Oral; ea                                               lp1 

04:24 02:16 Home Meds: Digoxin Oral; ea                                                       lp1 

04:24 02:16 Home Meds: Lasix Oral; ea                                                         lp1 

04:24 02:16 Home Meds: MagOx oral oral; ea                                                    lp1 

04:31 04:24 Home Meds: atorvastatin oral oral nightly; lp1                                    lp1 

04:31 04:24 Home Meds: Lasix Oral once daily; lp1                                             lp1 

04:31 04:24 Home Meds: Potassium Chloride Oral once daily; lp1                                lp1 

                                                                                                  

**************************************************************************************************

## 2018-12-20 NOTE — ECHO
HEIGHT: 5 ft 8 in   WEIGHT: 170 lb 0 oz   DATE OF STUDY: 12/20/2018   REFER DR: 
Kamari Gunter MD

2-DIMENSIONAL: YES

     M.MODE: YES

 DOPPLER: YES

COLOR FLOW: YES



                    TDS:  

PORTABLE: 

 DEFINITY:  

BUBBLE STUDY: 





DIAGNOSIS:  CONGESTIVE HEART FAILURE



CARDIAC HISTORY:  

CATHERIZATION: NO

SURGERY: NO

PROSTHETIC VALVE: NO

PACEMAKER: NO





MEASUREMENTS (cm)

    DIASTOLIC (NORMALS)                 SYSTOLIC (NORMALS)

IVSd                 1.1 (0.6-1.2)                    LA Diam 5.5 (1.9-4.0)     LVEF       
  76%  

LVIDd               5.2 (3.5-5.7)                        LVIDs      2.9 (2.0-3.5)     %FS  
        45%

LVPWd             0.9 (0.6-1.2)

Ao Diam           3.1 (2.0-3.7)



2 DIMENSIONAL ASSESSMENT:

RIGHT ATRIUM:                   NORMAL

LEFT ATRIUM:       DILATED



RIGHT VENTRICLE:            NORMAL

LEFT VENTRICLE: NORMAL



TRICUSPID VALVE:             NORMAL

MITRAL VALVE:     MITRAL ANNULAR CALCIFICATION, 

                                MITRAL VALVE PROLAPSE



PULMONIC VALVE:             NORMAL

AORTIC VALVE:     SCLEROSIS



PERICARDIAL EFFUSION: NONE

AORTIC ROOT:      NORMAL





LEFT VENTRICULAR WALL MOTION:     NORMAL



DOPPLER/COLOR FLOW:     MILD TRICUSPID REGURGITATION  MILD PULMOANRY HYPERTENSION.



COMMENTS:      MIDL PULMONARY HYPERTENSION  MILD TRICUSPID REGURGITATION.  

NORMAL EJECTION FRACTION.  MITRAL ANNULAR CALCIFICATION, MITRAL VALVE PROLAPSE.  

AORTIC SCLEROSIS.



TECHNOLOGIST:   TONIA BARBOSA

## 2018-12-20 NOTE — EDPHYS
Physician Documentation                                                                           

 [facility]                                                                

Name: Trish Landrum                                                                                  

Age: 78 yrs                                                                                       

Sex: Female                                                                                       

: 1940                                                                                   

MRN: [id number]                                                                                   

Arrival Date: 2018                                                                          

Time: 01:44                                                                                       

Account#: [id number]                                                                            

Bed 7                                                                                             

Private MD:                                                                                       

ED Physician Kamari Gunter                                                                      

HPI:                                                                                              

                                                                                             

02:29 This 78 yrs old  Female presents to ER via EMS with complaints of Abdominal    jr8 

      pain and shortness of breath.                                                               

02:29 Patient complained of diarrhea and abdominal pain for the past three days. Pain         jr8 

      worsening and now having acute onset shortness of breath. Onset: The symptoms/episode       

      began/occurred acutely. Severity of symptoms: At their worst the symptoms were moderate     

      in the emergency department the symptoms are unchanged. The patient has not experienced     

      similar symptoms in the past. The patient has not recently seen a physician.                

                                                                                                  

Historical:                                                                                       

- Allergies:                                                                                      

02:16 Sulfa (Sulfonamide Antibiotics);                                                        ea  

- Home Meds:                                                                                      

04:24 Ferrous Sulfate Oral nightly [Active]; digoxin 125 mcg oral tab once daily [Active];    lp1 

      MagOx 400 mg oral tab daily [Active]; Melatonin Oral nightly [Active]; Protonix 40 mg       

      Oral TbEC 1 tab once daily [Active]; calcium carbonate 500 mg calcium (1,250 mg) Oral       

      tab 1,000 mg twice a day [Active]; Vitamin D3 2,000 unit oral tab daily [Active];           

      omega-3s-dha-epa-fish oil-D3 300 mg-1,200 mg -1,000 unit oral cap nightly [Active];         

04:31 potassium chloride 20 mEq oral TbER once daily [Active]; Lasix 20 mg oral tab once      lp1 

      daily [Active]; atorvastatin 10 mg oral tab nightly [Active];                               

- PMHx:                                                                                           

02:16 Atrial Fib;                                                                             ea  

04:24 Corrosive gastritis; Anemia;                                                            lp1 

04:31 Aortic valve regurgitation; Mitral valve regurgitation; CVA; LVH; pulmonary HTN;        lp1 

                                                                                                  

- Immunization history:: Adult Immunizations up to date.                                          

- Social history:: Smoking status: Patient/guardian denies using tobacco.                         

- Ebola Screening: : No symptoms or risks identified at this time.                                

                                                                                                  

                                                                                                  

ROS:                                                                                              

02:29 Eyes: Negative for injury, pain, redness, and discharge, ENT: Negative for injury,      jr8 

      pain, and discharge, Neck: Negative for injury, pain, and swelling, Cardiovascular:         

      Negative for chest pain, palpitations, and edema, Back: Negative for injury and pain,       

      MS/Extremity: Negative for injury and deformity, Skin: Negative for injury, rash, and       

      discoloration, Neuro: Negative for headache, weakness, numbness, tingling, and seizure.     

02:29 Respiratory: Positive for shortness of breath.                                              

02:29 Abdomen/GI: Positive for abdominal pain, diarrhea.                                          

                                                                                                  

Exam:                                                                                             

02:29 Eyes:  Pupils equal round and reactive to light, extra-ocular motions intact.  Lids and jr8 

      lashes normal.  Conjunctiva and sclera are non-icteric and not injected.  Cornea within     

      normal limits.  Periorbital areas with no swelling, redness, or edema. ENT:  Nares          

      patent. No nasal discharge, no septal abnormalities noted.  Tympanic membranes are          

      normal and external auditory canals are clear.  Oropharynx with no redness, swelling,       

      or masses, exudates, or evidence of obstruction, uvula midline.  Mucous membranes           

      moist. Neck:  Trachea midline, no thyromegaly or masses palpated, and no cervical           

      lymphadenopathy.  Supple, full range of motion without nuchal rigidity, or vertebral        

      point tenderness.  No Meningismus. Back:  No spinal tenderness.  No costovertebral          

      tenderness.  Full range of motion. Skin:  Warm, dry with normal turgor.  Normal color       

      with no rashes, no lesions, and no evidence of cellulitis. MS/ Extremity:  Pulses           

      equal, no cyanosis.  Neurovascular intact.  Full, normal range of motion. Neuro:  Awake     

      and alert, GCS 15, oriented to person, place, time, and situation.  Cranial nerves          

      II-XII grossly intact.  Motor strength 5/5 in all extremities.  Sensory grossly intact.     

       Cerebellar exam normal.  Normal gait.                                                      

02:29 Cardiovascular: Rate: tachycardic, Rhythm: irregularly irregular, Pulses: Pulses are 2+     

      in right radial artery and left radial artery. Heart sounds: normal, normal S1and S2,       

      no S3 or S4, no murmur, no rub, no gallop, Edema: is not appreciated.                       

02:29 Respiratory: mild respiratory distress is noted,  Respirations: labored breathing,          

      tachypnea, Breath sounds: decreased breath sounds, that are mild, are located in both       

      bases.                                                                                      

02:29 Abdomen/GI: Inspection: scar(s), are noted in the , Palpation: soft, in all quadrants,      

      mild abdominal tenderness, in the abdomen diffusely, mass, is not appreciated, rebound      

      tenderness, is not appreciated, voluntary guarding, is not appreciated, involuntary         

      guarding, is not appreciated, no appreciated organomegaly, Indicators: McBurney's point     

      is not tender, Martinez's sign is negative, Rovsing's sign is negative, Liver:                

      tenderness, is not appreciated.                                                             

                                                                                                  

Vital Signs:                                                                                      

01:45  / 88; Pulse 123; Resp 32; Temp 97.6; Pulse Ox 89% on Non-rebreather mask; Weight ea  

      77.11 kg; Height 5 ft. 8 in. (172.72 cm);                                                   

02:26  / 72; Pulse 79; Resp 23; Pulse Ox 99% ;                                          ea  

02:53  / 65; Pulse 90; Resp 23; Pulse Ox 100% on BiPAP O2 75%, IPAP 20, PIP 16;         ea  

04:00  / 53; Pulse 87; Resp 24; Pulse Ox 97% on 4 lpm NC;                               ca1 

05:00  / 62; Pulse 88; Resp 23; Pulse Ox 95% on 4 lpm NC; Pain 0/10;                    ca1 

06:30  / 60; Pulse 84; Resp 23; Pulse Ox 100% on 4 lpm NC;                              ca1 

01:45 Body Mass Index 25.85 (77.11 kg, 172.72 cm)                                             ea  

01:45 Pt placed on bipap per respiratory tech                                                 ea  

                                                                                                  

MDM:                                                                                              

01:53 Patient medically screened.                                                             [facility] 

02:29 Data reviewed: vital signs, nurses notes, lab test result(s), EKG, radiologic studies,  jr 

      plain films. Data interpreted: Pulse oximetry: on room air is 100 %. Interpretation:        

      normal. Counseling: I had a detailed discussion with the patient and/or guardian            

      regarding: the historical points, exam findings, and any diagnostic results supporting      

      the discharge/admit diagnosis, lab results, radiology results, the need for further         

      work-up and treatment in the hospital.                                                      

                                                                                                  

                                                                                             

01:54 Order name: Basic Metabolic Panel; Complete Time: 03:09                                                                                                                              

01:54 Order name: CBC with Diff; Complete Time: 02:28                                                                                                                                      

01:54 Order name: LFT's; Complete Time: 03:09                                                                                                                                              

01:54 Order name: Magnesium; Complete Time: 03:                                                                                             

01:54 Order name: NT PRO-BNP; Complete Time: 03:09                                            [facility] 

                                                                                             

01:54 Order name: PT-INR; Complete Time: 02:29                                                [facility] 

                                                                                             

01:54 Order name: Troponin (emerg Dept Use Only); Complete Time: 03:09                        [facility] 

                                                                                             

01:54 Order name: Lipase; Complete Time: 03:09                                                [facility] 

                                                                                             

01:59 Order name: Blood Culture Adult (2)                                                     [facility] 

                                                                                             

01:59 Order name: Lactate; Complete Time: 03:09                                               [facility] 

                                                                                             

02:29 Order name: ABG                                                                          

                                                                                             

02:29 Order name: ABG Arterial Blood Gas; Complete Time: 03:09                                [facility]

                                                                                             

03:28 Order name: Digoxin; Complete Time: 04:30                                               [facility] 

                                                                                             

05:36 Order name: Lactate Sepsis 2 HR Follow-up; Complete Time: 23:13                         [facility]

                                                                                             

01:54 Order name: XRAY Chest (1 view); Complete Time: 23:13                                   [facility] 

                                                                                             

01:54 Order name: EKG; Complete Time: 01:55                                                    

                                                                                             

01:54 Order name: Cardiac monitoring; Complete Time: 02:38                                    [facility] 

                                                                                             

01:54 Order name: EKG - Nurse/Tech; Complete Time: 02:38                                      [facility] 

                                                                                             

01:54 Order name: IV Saline Lock; Complete Time: 02:38                                        [facility] 

                                                                                             

03:10 Order name: CT Abd/Pelvis - W/Contrast; Complete Time: 23:13                            [facility] 

                                                                                             

05:11 Order name: CONS Physician Consult                                                      [facility]

                                                                                             

01:54 Order name: Labs collected and sent; Complete Time: 02:38                               [facility] 

                                                                                             

01:54 Order name: O2 Per Protocol; Complete Time: 02:38                                       [facility] 

                                                                                             

01:54 Order name: O2 Sat Monitoring; Complete Time: 02:38                                      

                                                                                                  

Administered Medications:                                                                         

01:50 Drug: Zofran 4 mg Route: IVP; Site: left antecubital;                                   ea  

03:01 Follow up: Response: No adverse reaction                                                ca1 

03:15 Drug: NS 0.9% 1000 ml Route: IV; Rate: 1000 ml; Site: right antecubital;                ca1 

04:20 Follow up: IV Status: Completed infusion                                                ca1 

03:20 Drug: LevaQUIN 500 mg Volume: 100 ml; Route: IVPB; Infused Over: 60 mins; Site: right   ca1 

      antecubital;                                                                                

04:20 Follow up: Response: No adverse reaction; IV Status: Completed infusion                 ca1 

04:36 Drug: SOLU-Medrol 125 mg Route: IVP; Site: right antecubital;                           ca1 

06:38 Follow up: Response: No adverse reaction                                                ca1 

04:40 Drug: Digoxin 0.5 mg Route: IVP; Site: right antecubital;                               ca1 

06:37 Follow up: Response: No adverse reaction                                                ca1 

05:10 Drug: ProTONIX 40 mg Route: IVP; Site: right antecubital;                               ca1 

06:38 Follow up: Response: No adverse reaction                                                ca1 

05:12 Drug: AtroVENT Aerosol 0.5 mg Route: Inhalation;                                        ca1 

05:12 Drug: Xopenex 1.25 mg Route: Inhalation;                                                ca1 

05:15 Drug: Lasix 20 mg Route: IVP; Site: right antecubital;                                  ca1 

06:38 Follow up: Urine output 120 ml; Response: No adverse reaction                           ca1 

                                                                                                  

                                                                                                  

Disposition:                                                                                      

04:57 Co-signature as Attending Physician, Sid Craven MD I agree with the assessment and    nichelle 

      plan of care.                                                                               

                                                                                                  

Disposition:                                                                                      

18 04:55 Hospitalization ordered by Sid Craven for Inpatient Admission. Preliminary      

  diagnosis are Dyspnea, Cardiomegaly, Cholelithiasis, Diastolic (congestive)                     

  heart failure, Pleural effusion in conditions classified elsewhere, Acidosis                    

  - respiratory acidosis, Atrial fibrillation and flutter.                                        

- Bed requested for Telemetry/MedSurg (Inpatient).                                                

- Status is Inpatient Admission.                                                              ca1 

- Condition is Fair.                                                                              

- Problem is new.                                                                                 

- Symptoms have improved.                                                                         

UTI on Admission? No                                                                              

                                                                                                  

                                                                                                  

                                                                                                  

Signatures:                                                                                       

Dispatcher [employer]                           EDMS                                                 

Kamari Gunter MD MD cha Martinez, Eric                               em1                                                  

Ayala Lucio RN                         RN   lp1                                                  

Bradford Bhatt PA                        PA   [id number]                                                  

Beulah Nolan RN RN ea Acob, Cheryl, RN                        RN   ca1                                                  

                                                                                                  

Corrections: (The following items were deleted from the chart)                                    

04:24 02:16 Home Meds: atorvastatin oral oral; ea                                             lp1 

04:24 02:16 Home Meds: Ferrous Sulfate Oral; ea                                               lp1 

04:24 02:16 Home Meds: Digoxin Oral; ea                                                       lp1 

04:24 02:16 Home Meds: Lasix Oral; ea                                                         lp1 

04:24 02:16 Home Meds: MagOx oral oral; ea                                                    lp1 

04:31 04:24 Home Meds: atorvastatin oral oral nightly; lp1                                    lp1 

04:31 04:24 Home Meds: Lasix Oral once daily; lp1                                             lp1 

04:31 04:24 Home Meds: Potassium Chloride Oral once daily; lp1                                lp1 

04:56 04:55 Hospitalization Ordered by Sid Craven MD for Inpatient Admission. Preliminary   nichelle 

      diagnosis is Dyspnea; Cardiomegaly; Cholelithiasis; Diastolic (congestive) heart            

      failure; Pleural effusion in conditions classified elsewhere; Acidosis - respiratory        

      acidosis. Bed requested for Telemetry/MedSurg (Inpatient). Status is Inpatient              

      Admission. Condition is Fair. Problem is new. Symptoms have improved. UTI on Admission?     

      No. nichelle                                                                                     

05:45 04:56 2018 04:55 Hospitalization Ordered by Sid Craven MD for Inpatient         em1 

      Admission. Preliminary diagnosis is Dyspnea; Cardiomegaly; Cholelithiasis; Diastolic        

      (congestive) heart failure; Pleural effusion in conditions classified elsewhere;            

      Acidosis - respiratory acidosis; Atrial fibrillation and flutter. Bed requested for         

      Telemetry/MedSurg (Inpatient). Status is Inpatient Admission. Condition is Fair.            

      Problem is new. Symptoms have improved. UTI on Admission? No. nichelle                           

07:05 05:45 2018 04:55 Hospitalization Ordered by Sid Craven MD for Inpatient         ca1 

      Admission. Preliminary diagnosis is Dyspnea; Cardiomegaly; Cholelithiasis; Diastolic        

      (congestive) heart failure; Pleural effusion in conditions classified elsewhere;            

      Acidosis - respiratory acidosis; Atrial fibrillation and flutter. Bed requested for         

      Telemetry/MedSurg (Inpatient). Status is Inpatient Admission. Condition is Fair.            

      Problem is new. Symptoms have improved. UTI on Admission? No. em1                           

                                                                                                  

**************************************************************************************************

## 2018-12-21 NOTE — CON
Date of Consultation:  12/20/2018



The patient was admitted to Dr. Craven's service on 12/19/2018.  I saw the patient myself on 12/20/201
8.



Reason For Consultation:  Atrial fibrillation with rapid ventricular response and history of congesti
ve heart failure.



History Of Present Illness:  Trish Landrum is a 78-year-old white woman, has a history of atrial fibrill
ation, gastritis, anemia, aortic regurgitation, moderate mitral regurgitation.  She has a history of 
pulmonary hypertension, left ventricular hypertrophy and CVA.  Came in with abdominal pain mid-epigas
tric to suprapubic with some shortness of breath, noted that her heart rate was fast.  Denies any luis
sea, vomiting or diaphoresis.  Denied any PND, orthopnea, pedal edema.  She denied any syncope.  She 
did have palpitations.  Denied any fever or chills.  She had a digoxin level of 0.6.  Her BNP was 251
0.  Her glucose was 295.  She is feeling better today.



Allergies:  INCLUDE SULFA.



Review of Systems:

Negative.



Social History:  Negative.



Family History:  Noncontributory.



Medications:  At home include Lipitor, digoxin, Lasix, iron, Protonix and potassium.



Physical Examination:

General:  She is pleasant, alert, and oriented x3, in atrial fibrillation at a rate of 110, afebrile.
 

HEENT:  Negative.  

Neck:  Supple without any bruit, lymphadenopathy, JVD, or thyromegaly.  Chest:  Clear to auscultation
 and percussion. 

Cardiac:  Revealed atrial fibrillation with mitral regurgitation, murmur and aortic sclerosis.  No ga
llops or rubs. Abdomen:  Benign. 

Extremities:  Revealed no clubbing, cyanosis, or edema. 

Skin:  Dry and intact. 

Vascular:  Examination was normal.  She had normal dorsalis pedis and posterior tibial bilaterally.



Diagnostic Data:  Stated earlier.



Impression And Plan:  

1.A rapid atrial fibrillation probably secondary to a viral syndrome with abdominal pain and shortne
ss of breath.  The patient is not a candidate for anticoagulation, because she has bled on both Xarel
to and Eliquis in the past.  There is an echocardiogram pending and I agree with that.

2.History of gastritis.

3.Anemia.

4.Moderate aortic and mitral regurgitation.

5.History of cerebrovascular accident that has resolved.

6.History of left ventricular hypertrophy.

7.Pulmonary hypertension. 



These problems are stable.  The case was discussed with Dr. Craven and with the patient and with the dora sommers.  We can certainly increase her digoxin dose when she goes home.  She may need to be gently hyd
rated.  She can certainly go home whenever it is okay with Dr. Craven.  We will see her in the office 
in the next 2-4 weeks.





SHEMAR

DD:  12/20/2018 23:30:59Voice ID:  [id number]

DT:  12/21/2018 04:03:01Report ID:  [id number]